# Patient Record
Sex: FEMALE | Race: WHITE | Employment: UNEMPLOYED | URBAN - METROPOLITAN AREA
[De-identification: names, ages, dates, MRNs, and addresses within clinical notes are randomized per-mention and may not be internally consistent; named-entity substitution may affect disease eponyms.]

---

## 2020-04-26 ENCOUNTER — APPOINTMENT (OUTPATIENT)
Dept: GENERAL RADIOLOGY | Age: 25
End: 2020-04-26
Attending: EMERGENCY MEDICINE
Payer: COMMERCIAL

## 2020-04-26 ENCOUNTER — HOSPITAL ENCOUNTER (EMERGENCY)
Age: 25
Discharge: HOME | End: 2020-04-26
Attending: EMERGENCY MEDICINE
Payer: COMMERCIAL

## 2020-04-26 VITALS
WEIGHT: 220 LBS | HEIGHT: 67 IN | DIASTOLIC BLOOD PRESSURE: 92 MMHG | HEART RATE: 101 BPM | BODY MASS INDEX: 34.53 KG/M2 | TEMPERATURE: 98.6 F | OXYGEN SATURATION: 100 % | RESPIRATION RATE: 16 BRPM | SYSTOLIC BLOOD PRESSURE: 146 MMHG

## 2020-04-26 DIAGNOSIS — R13.10 ODYNOPHAGIA: Primary | ICD-10-CM

## 2020-04-26 PROCEDURE — 99282 EMERGENCY DEPT VISIT SF MDM: CPT

## 2020-04-26 PROCEDURE — 70360 X-RAY EXAM OF NECK: CPT

## 2020-04-26 RX ORDER — SPIRONOLACTONE 50 MG/1
50 TABLET, FILM COATED ORAL 2 TIMES DAILY
COMMUNITY

## 2020-04-26 NOTE — ED TRIAGE NOTES
Pt ambulated to the treatment area with a steady gait. Pt states \"I ate chicken nugget fruit and fish I choked on the nugget and starting today around 2pm I had a feeling like something was stuck in my throat. \" Pt appears in no distress able to speak in full sentences able to swallow secretions.

## 2020-04-26 NOTE — ED PROVIDER NOTES
HPI the patient had lunch around noon today. She ate flounder and fruit. Approximately 2:30 PM she developed a pain in the laryngeal area, midline that is worse when she swallows. She denies swallowing a fishbone or other foreign body and she denies having any symptoms at the time she ate. She is able to swallow liquids and her saliva with no difficulty at this point. She denies shortness of breath, chest pain or other complaints. Past Medical History:   Diagnosis Date    Asthma     PCOS (polycystic ovarian syndrome)        Past Surgical History:   Procedure Laterality Date    HX OTHER SURGICAL      birthmark removal         History reviewed. No pertinent family history.     Social History     Socioeconomic History    Marital status: SINGLE     Spouse name: Not on file    Number of children: Not on file    Years of education: Not on file    Highest education level: Not on file   Occupational History    Not on file   Social Needs    Financial resource strain: Not on file    Food insecurity     Worry: Not on file     Inability: Not on file    Transportation needs     Medical: Not on file     Non-medical: Not on file   Tobacco Use    Smoking status: Never Smoker   Substance and Sexual Activity    Alcohol use: Never     Frequency: Never    Drug use: Never    Sexual activity: Not on file   Lifestyle    Physical activity     Days per week: Not on file     Minutes per session: Not on file    Stress: Not on file   Relationships    Social connections     Talks on phone: Not on file     Gets together: Not on file     Attends Mu-ism service: Not on file     Active member of club or organization: Not on file     Attends meetings of clubs or organizations: Not on file     Relationship status: Not on file    Intimate partner violence     Fear of current or ex partner: Not on file     Emotionally abused: Not on file     Physically abused: Not on file     Forced sexual activity: Not on file   Other Topics Concern    Not on file   Social History Narrative    Not on file         ALLERGIES: Dairy aid [lactase]    Review of Systems   HENT: Positive for trouble swallowing. There were no vitals filed for this visit. Physical Exam  Constitutional:       General: She is not in acute distress. Appearance: She is well-developed. HENT:      Head: Normocephalic. Mouth/Throat:      Mouth: Mucous membranes are moist.      Pharynx: Oropharynx is clear. Uvula midline. No pharyngeal swelling, oropharyngeal exudate or posterior oropharyngeal erythema. Comments: No foreign body noted  Eyes:      Pupils: Pupils are equal, round, and reactive to light. Neck:      Musculoskeletal: Normal range of motion. Comments: No swelling or tenderness noted  Cardiovascular:      Rate and Rhythm: Normal rate. Heart sounds: No murmur. Pulmonary:      Effort: Pulmonary effort is normal.      Breath sounds: Normal breath sounds. Musculoskeletal: Normal range of motion. Skin:     General: Skin is warm and dry. Capillary Refill: Capillary refill takes less than 2 seconds. Neurological:      Mental Status: She is alert.    Psychiatric:         Behavior: Behavior normal.          MDM       Procedures

## 2023-05-15 ENCOUNTER — HOSPITAL ENCOUNTER (OUTPATIENT)
Facility: HOSPITAL | Age: 28
Setting detail: RECURRING SERIES
Discharge: HOME | End: 2023-05-18
Payer: MEDICAID

## 2023-05-15 PROCEDURE — 97162 PT EVAL MOD COMPLEX 30 MIN: CPT

## 2023-05-15 NOTE — PROGRESS NOTES
Physical Therapy at [facility],   a part of  [facility]  [address] [facility]  Phone: [phone]  Fax: [phone]       PHYSICAL THERAPY - EVALUATION/PLAN OF CARE NOTE (updated 3/23)      Date: 5/15/2023          Patient Name:  Mis Monzon :  1995   Medical   Diagnosis:  Other abnormalities of gait and mobility [R26.89] Treatment Diagnosis:  M79.671  RIGHT FOOT PAIN and M79.672  LEFT FOOT PAIN    Referral Source:  Brook Worrell MD Provider #:  [id number]                Insurance: Payor: [employer]  / Plan: Saray Pryor / Product Type: *No Product type* /      Patient  verified yes     Visit #   Current  / Total 1 24   Time   In / Out 12:45 PM 1:45 PM   Total Treatment Time 60   Total Timed Codes 0         SUBJECTIVE  Pain Level (0-10 scale): stiff  [x]constant []intermittent []improving []worsening []no change since onset    Any medication changes, allergies to medications, adverse drug reactions, diagnosis change, or new procedure performed?: [x] No    [] Yes (see summary sheet for update)  Medications: Verified on Patient Summary List    Subjective functional status/changes:     Gradual worsening of stiffness along B hands, feet, and neck.  Decline in physical activity due to pain    Start of Care: 5/15/2023  Onset Date:   Current symptoms/Complaints: Poor tolerance to standing and walking at work  Mechanism of Injury: Gradual onset several years ago  PLOF: Very active several years ago with sports, particularly volleyball  Limitations to PLOF/Activity or Recreational Limitations: No limitations with walking and standing prior to   Work Hx: Part-time at [address] [place] [address]: lives in a two story home with family  Mobility: Independent with transfers and bed mobility, significantly limited in ability to stand on her feet (<10 min)  Self Care: Hand stiffness causes

## 2023-05-18 ENCOUNTER — APPOINTMENT (OUTPATIENT)
Facility: HOSPITAL | Age: 28
End: 2023-05-18
Payer: MEDICAID

## 2023-05-18 RX ORDER — RABIES VACC, HUMAN DIPLOID/PF 2.5 UNIT
2.5 VIAL (EA) INTRAMUSCULAR PRN
COMMUNITY
Start: 2015-10-23

## 2023-05-18 RX ORDER — SPIRONOLACTONE 50 MG/1
50 TABLET, FILM COATED ORAL 2 TIMES DAILY
COMMUNITY

## 2023-05-18 RX ORDER — IBUPROFEN 600 MG/1
600 TABLET ORAL EVERY 6 HOURS PRN
COMMUNITY
Start: 2014-11-17

## 2023-05-22 ENCOUNTER — APPOINTMENT (OUTPATIENT)
Facility: HOSPITAL | Age: 28
End: 2023-05-22
Payer: MEDICAID